# Patient Record
Sex: FEMALE | Race: WHITE | NOT HISPANIC OR LATINO | URBAN - METROPOLITAN AREA
[De-identification: names, ages, dates, MRNs, and addresses within clinical notes are randomized per-mention and may not be internally consistent; named-entity substitution may affect disease eponyms.]

---

## 2024-10-29 ENCOUNTER — OUTPATIENT (OUTPATIENT)
Dept: OUTPATIENT SERVICES | Facility: HOSPITAL | Age: 59
LOS: 1 days | End: 2024-10-29
Payer: COMMERCIAL

## 2024-10-29 ENCOUNTER — APPOINTMENT (OUTPATIENT)
Dept: CT IMAGING | Facility: CLINIC | Age: 59
End: 2024-10-29

## 2024-10-29 DIAGNOSIS — Z87.891 PERSONAL HISTORY OF NICOTINE DEPENDENCE: ICD-10-CM

## 2024-10-29 PROCEDURE — 71271 CT THORAX LUNG CANCER SCR C-: CPT

## 2024-10-29 PROCEDURE — 71271 CT THORAX LUNG CANCER SCR C-: CPT | Mod: 26

## 2024-11-05 ENCOUNTER — APPOINTMENT (OUTPATIENT)
Dept: RHEUMATOLOGY | Facility: CLINIC | Age: 59
End: 2024-11-05
Payer: MEDICARE

## 2024-11-05 ENCOUNTER — NON-APPOINTMENT (OUTPATIENT)
Age: 59
End: 2024-11-05

## 2024-11-05 ENCOUNTER — APPOINTMENT (OUTPATIENT)
Dept: CARDIOLOGY | Facility: CLINIC | Age: 59
End: 2024-11-05
Payer: COMMERCIAL

## 2024-11-05 VITALS
HEART RATE: 72 BPM | DIASTOLIC BLOOD PRESSURE: 76 MMHG | WEIGHT: 136 LBS | OXYGEN SATURATION: 96 % | HEIGHT: 60 IN | BODY MASS INDEX: 26.7 KG/M2 | SYSTOLIC BLOOD PRESSURE: 118 MMHG

## 2024-11-05 VITALS
HEIGHT: 60 IN | OXYGEN SATURATION: 96 % | WEIGHT: 136 LBS | DIASTOLIC BLOOD PRESSURE: 73 MMHG | BODY MASS INDEX: 26.7 KG/M2 | SYSTOLIC BLOOD PRESSURE: 108 MMHG | HEART RATE: 90 BPM

## 2024-11-05 DIAGNOSIS — R31.9 HEMATURIA, UNSPECIFIED: ICD-10-CM

## 2024-11-05 DIAGNOSIS — M19.90 UNSPECIFIED OSTEOARTHRITIS, UNSPECIFIED SITE: ICD-10-CM

## 2024-11-05 DIAGNOSIS — R07.89 OTHER CHEST PAIN: ICD-10-CM

## 2024-11-05 DIAGNOSIS — E78.5 HYPERLIPIDEMIA, UNSPECIFIED: ICD-10-CM

## 2024-11-05 DIAGNOSIS — Z71.85 ENCOUNTER FOR IMMUNIZATION SAFETY COUNSELING: ICD-10-CM

## 2024-11-05 DIAGNOSIS — M06.9 RHEUMATOID ARTHRITIS, UNSPECIFIED: ICD-10-CM

## 2024-11-05 DIAGNOSIS — M81.0 AGE-RELATED OSTEOPOROSIS W/OUT CURRENT PATHOLOGICAL FRACTURE: ICD-10-CM

## 2024-11-05 DIAGNOSIS — R30.0 DYSURIA: ICD-10-CM

## 2024-11-05 DIAGNOSIS — R06.00 DYSPNEA, UNSPECIFIED: ICD-10-CM

## 2024-11-05 DIAGNOSIS — Z79.899 OTHER LONG TERM (CURRENT) DRUG THERAPY: ICD-10-CM

## 2024-11-05 DIAGNOSIS — Z79.60 LONG TERM (CURRENT) USE OF UNSPECIFIED IMMUNOMODULATORS AND IMMUNOSUPPRESSANTS: ICD-10-CM

## 2024-11-05 DIAGNOSIS — Z51.81 ENCOUNTER FOR THERAPEUTIC DRUG LVL MONITORING: ICD-10-CM

## 2024-11-05 DIAGNOSIS — M35.00 SICCA SYNDROME, UNSPECIFIED: ICD-10-CM

## 2024-11-05 PROCEDURE — 93000 ELECTROCARDIOGRAM COMPLETE: CPT

## 2024-11-05 PROCEDURE — G2211 COMPLEX E/M VISIT ADD ON: CPT

## 2024-11-05 PROCEDURE — 99204 OFFICE O/P NEW MOD 45 MIN: CPT

## 2024-11-05 PROCEDURE — 99215 OFFICE O/P EST HI 40 MIN: CPT

## 2024-11-06 LAB
ALBUMIN SERPL ELPH-MCNC: 4.3 G/DL
ALP BLD-CCNC: 71 U/L
ALT SERPL-CCNC: 25 U/L
ANION GAP SERPL CALC-SCNC: 15 MMOL/L
APPEARANCE: CLEAR
AST SERPL-CCNC: 31 U/L
BILIRUB SERPL-MCNC: 0.6 MG/DL
BILIRUBIN URINE: NEGATIVE
BLOOD URINE: NEGATIVE
BUN SERPL-MCNC: 13 MG/DL
CALCIUM SERPL-MCNC: 9.6 MG/DL
CHLORIDE SERPL-SCNC: 106 MMOL/L
CO2 SERPL-SCNC: 21 MMOL/L
COLOR: NORMAL
CREAT SERPL-MCNC: 0.72 MG/DL
CRP SERPL-MCNC: <3 MG/L
EGFR: 96 ML/MIN/1.73M2
ERYTHROCYTE [SEDIMENTATION RATE] IN BLOOD BY WESTERGREN METHOD: 13 MM/HR
GLUCOSE QUALITATIVE U: NEGATIVE MG/DL
HCT VFR BLD CALC: 44.5 %
HGB BLD-MCNC: 14.2 G/DL
KETONES URINE: NEGATIVE MG/DL
LEUKOCYTE ESTERASE URINE: NEGATIVE
MCHC RBC-ENTMCNC: 29.5 PG
MCHC RBC-ENTMCNC: 31.9 G/DL
MCV RBC AUTO: 92.3 FL
NITRITE URINE: NEGATIVE
PH URINE: 5.5
PLATELET # BLD AUTO: 343 K/UL
POTASSIUM SERPL-SCNC: 4.2 MMOL/L
PROT SERPL-MCNC: 6.6 G/DL
PROTEIN URINE: NEGATIVE MG/DL
RBC # BLD: 4.82 M/UL
RBC # FLD: 14.5 %
SODIUM SERPL-SCNC: 142 MMOL/L
SPECIFIC GRAVITY URINE: 1.03
UROBILINOGEN URINE: 0.2 MG/DL
WBC # FLD AUTO: 10.58 K/UL

## 2024-11-08 ENCOUNTER — TRANSCRIPTION ENCOUNTER (OUTPATIENT)
Age: 59
End: 2024-11-08

## 2024-11-08 RX ORDER — UPADACITINIB 15 MG/1
15 TABLET, EXTENDED RELEASE ORAL
Qty: 30 | Refills: 11 | Status: ACTIVE | COMMUNITY
Start: 2024-11-05

## 2024-11-11 ENCOUNTER — NON-APPOINTMENT (OUTPATIENT)
Age: 59
End: 2024-11-11

## 2024-11-13 ENCOUNTER — TRANSCRIPTION ENCOUNTER (OUTPATIENT)
Age: 59
End: 2024-11-13

## 2024-11-13 LAB — BACTERIA UR CULT: ABNORMAL

## 2024-11-26 ENCOUNTER — TRANSCRIPTION ENCOUNTER (OUTPATIENT)
Age: 59
End: 2024-11-26

## 2024-11-26 DIAGNOSIS — R35.0 FREQUENCY OF MICTURITION: ICD-10-CM

## 2024-12-03 ENCOUNTER — APPOINTMENT (OUTPATIENT)
Dept: RADIOLOGY | Facility: CLINIC | Age: 59
End: 2024-12-03
Payer: COMMERCIAL

## 2024-12-03 ENCOUNTER — OUTPATIENT (OUTPATIENT)
Dept: OUTPATIENT SERVICES | Facility: HOSPITAL | Age: 59
LOS: 1 days | End: 2024-12-03
Payer: COMMERCIAL

## 2024-12-03 ENCOUNTER — NON-APPOINTMENT (OUTPATIENT)
Age: 59
End: 2024-12-03

## 2024-12-03 ENCOUNTER — APPOINTMENT (OUTPATIENT)
Dept: INTERNAL MEDICINE | Facility: CLINIC | Age: 59
End: 2024-12-03
Payer: COMMERCIAL

## 2024-12-03 ENCOUNTER — TRANSCRIPTION ENCOUNTER (OUTPATIENT)
Age: 59
End: 2024-12-03

## 2024-12-03 VITALS
DIASTOLIC BLOOD PRESSURE: 76 MMHG | WEIGHT: 135 LBS | BODY MASS INDEX: 26.5 KG/M2 | HEIGHT: 60 IN | RESPIRATION RATE: 16 BRPM | SYSTOLIC BLOOD PRESSURE: 126 MMHG | TEMPERATURE: 99.5 F | OXYGEN SATURATION: 96 % | HEART RATE: 85 BPM

## 2024-12-03 DIAGNOSIS — R10.9 UNSPECIFIED ABDOMINAL PAIN: ICD-10-CM

## 2024-12-03 DIAGNOSIS — R19.7 DIARRHEA, UNSPECIFIED: ICD-10-CM

## 2024-12-03 DIAGNOSIS — R11.0 NAUSEA: ICD-10-CM

## 2024-12-03 PROCEDURE — 99214 OFFICE O/P EST MOD 30 MIN: CPT

## 2024-12-03 PROCEDURE — 74019 RADEX ABDOMEN 2 VIEWS: CPT

## 2024-12-03 PROCEDURE — 74019 RADEX ABDOMEN 2 VIEWS: CPT | Mod: 26

## 2024-12-04 ENCOUNTER — EMERGENCY (EMERGENCY)
Facility: HOSPITAL | Age: 59
LOS: 1 days | Discharge: HOME | End: 2024-12-04
Attending: STUDENT IN AN ORGANIZED HEALTH CARE EDUCATION/TRAINING PROGRAM
Payer: COMMERCIAL

## 2024-12-04 ENCOUNTER — APPOINTMENT (OUTPATIENT)
Dept: CT IMAGING | Facility: CLINIC | Age: 59
End: 2024-12-04

## 2024-12-04 VITALS
TEMPERATURE: 98 F | HEART RATE: 75 BPM | RESPIRATION RATE: 16 BRPM | OXYGEN SATURATION: 98 % | DIASTOLIC BLOOD PRESSURE: 71 MMHG | SYSTOLIC BLOOD PRESSURE: 113 MMHG

## 2024-12-04 VITALS
TEMPERATURE: 98 F | HEIGHT: 60 IN | DIASTOLIC BLOOD PRESSURE: 64 MMHG | RESPIRATION RATE: 16 BRPM | HEART RATE: 76 BPM | SYSTOLIC BLOOD PRESSURE: 125 MMHG | OXYGEN SATURATION: 100 % | WEIGHT: 128.97 LBS

## 2024-12-04 LAB
ALBUMIN SERPL ELPH-MCNC: 4.2 G/DL
ALBUMIN SERPL ELPH-MCNC: 4.2 G/DL — SIGNIFICANT CHANGE UP (ref 3.3–5)
ALP BLD-CCNC: 177 U/L
ALP SERPL-CCNC: 207 U/L — HIGH (ref 40–120)
ALT FLD-CCNC: 77 U/L — HIGH (ref 10–45)
ALT SERPL-CCNC: 63 U/L
ANION GAP SERPL CALC-SCNC: 15 MMOL/L — SIGNIFICANT CHANGE UP (ref 5–17)
ANION GAP SERPL CALC-SCNC: 21 MMOL/L
APPEARANCE UR: CLEAR — SIGNIFICANT CHANGE UP
APTT BLD: 28.4 SEC — SIGNIFICANT CHANGE UP (ref 24.5–35.6)
AST SERPL-CCNC: 68 U/L
AST SERPL-CCNC: 78 U/L — HIGH (ref 10–40)
BACTERIA # UR AUTO: NEGATIVE /HPF — SIGNIFICANT CHANGE UP
BACTERIA UR CULT: NORMAL
BASOPHILS # BLD AUTO: 0.03 K/UL — SIGNIFICANT CHANGE UP (ref 0–0.2)
BASOPHILS # BLD AUTO: 0.04 K/UL
BASOPHILS NFR BLD AUTO: 0.3 % — SIGNIFICANT CHANGE UP (ref 0–2)
BASOPHILS NFR BLD AUTO: 0.4 %
BILIRUB SERPL-MCNC: 1 MG/DL — SIGNIFICANT CHANGE UP (ref 0.2–1.2)
BILIRUB SERPL-MCNC: 1.2 MG/DL
BILIRUB UR-MCNC: NEGATIVE — SIGNIFICANT CHANGE UP
BLD GP AB SCN SERPL QL: NEGATIVE — SIGNIFICANT CHANGE UP
BUN SERPL-MCNC: 11 MG/DL
BUN SERPL-MCNC: 8 MG/DL — SIGNIFICANT CHANGE UP (ref 7–23)
CALCIUM SERPL-MCNC: 9.8 MG/DL
CALCIUM SERPL-MCNC: 9.9 MG/DL — SIGNIFICANT CHANGE UP (ref 8.4–10.5)
CAST: 0 /LPF — SIGNIFICANT CHANGE UP (ref 0–4)
CHLORIDE SERPL-SCNC: 100 MMOL/L — SIGNIFICANT CHANGE UP (ref 96–108)
CHLORIDE SERPL-SCNC: 99 MMOL/L
CO2 SERPL-SCNC: 20 MMOL/L
CO2 SERPL-SCNC: 23 MMOL/L — SIGNIFICANT CHANGE UP (ref 22–31)
COLOR SPEC: YELLOW — SIGNIFICANT CHANGE UP
COVID-19 NUCLEOCAPSID  GAM ANTIBODY INTERPRETATION: POSITIVE
COVID-19 SPIKE DOMAIN ANTIBODY INTERPRETATION: POSITIVE
CREAT SERPL-MCNC: 0.69 MG/DL — SIGNIFICANT CHANGE UP (ref 0.5–1.3)
CREAT SERPL-MCNC: 0.74 MG/DL
CRP SERPL-MCNC: 135 MG/L
DIFF PNL FLD: ABNORMAL
EGFR: 93 ML/MIN/1.73M2
EGFR: 99 ML/MIN/1.73M2 — SIGNIFICANT CHANGE UP
EOSINOPHIL # BLD AUTO: 0.01 K/UL — SIGNIFICANT CHANGE UP (ref 0–0.5)
EOSINOPHIL # BLD AUTO: 0.09 K/UL
EOSINOPHIL NFR BLD AUTO: 0.1 % — SIGNIFICANT CHANGE UP (ref 0–6)
EOSINOPHIL NFR BLD AUTO: 0.8 %
ERYTHROCYTE [SEDIMENTATION RATE] IN BLOOD BY WESTERGREN METHOD: 63 MM/HR
GAS PNL BLDV: SIGNIFICANT CHANGE UP
GLUCOSE SERPL-MCNC: 104 MG/DL — HIGH (ref 70–99)
GLUCOSE UR QL: NEGATIVE MG/DL — SIGNIFICANT CHANGE UP
HCT VFR BLD CALC: 44.4 % — SIGNIFICANT CHANGE UP (ref 34.5–45)
HCT VFR BLD CALC: 46.8 %
HGB BLD-MCNC: 15 G/DL — SIGNIFICANT CHANGE UP (ref 11.5–15.5)
HGB BLD-MCNC: 15.6 G/DL
IMM GRANULOCYTES NFR BLD AUTO: 0.4 %
IMM GRANULOCYTES NFR BLD AUTO: 0.4 % — SIGNIFICANT CHANGE UP (ref 0–0.9)
INR BLD: 1.07 RATIO — SIGNIFICANT CHANGE UP (ref 0.85–1.16)
KETONES UR-MCNC: 15 MG/DL
LEUKOCYTE ESTERASE UR-ACNC: NEGATIVE — SIGNIFICANT CHANGE UP
LIDOCAIN IGE QN: 27 U/L — SIGNIFICANT CHANGE UP (ref 7–60)
LYMPHOCYTES # BLD AUTO: 1.59 K/UL — SIGNIFICANT CHANGE UP (ref 1–3.3)
LYMPHOCYTES # BLD AUTO: 1.81 K/UL
LYMPHOCYTES # BLD AUTO: 15.3 % — SIGNIFICANT CHANGE UP (ref 13–44)
LYMPHOCYTES NFR BLD AUTO: 16 %
MAGNESIUM SERPL-MCNC: 2.1 MG/DL — SIGNIFICANT CHANGE UP (ref 1.6–2.6)
MAN DIFF?: NORMAL
MCHC RBC-ENTMCNC: 29.4 PG
MCHC RBC-ENTMCNC: 29.4 PG — SIGNIFICANT CHANGE UP (ref 27–34)
MCHC RBC-ENTMCNC: 33.3 G/DL
MCHC RBC-ENTMCNC: 33.8 G/DL — SIGNIFICANT CHANGE UP (ref 32–36)
MCV RBC AUTO: 87.1 FL — SIGNIFICANT CHANGE UP (ref 80–100)
MCV RBC AUTO: 88.3 FL
MONOCYTES # BLD AUTO: 0.83 K/UL
MONOCYTES # BLD AUTO: 0.96 K/UL — HIGH (ref 0–0.9)
MONOCYTES NFR BLD AUTO: 7.4 %
MONOCYTES NFR BLD AUTO: 9.3 % — SIGNIFICANT CHANGE UP (ref 2–14)
NEUTROPHILS # BLD AUTO: 7.73 K/UL — HIGH (ref 1.8–7.4)
NEUTROPHILS # BLD AUTO: 8.46 K/UL
NEUTROPHILS NFR BLD AUTO: 74.6 % — SIGNIFICANT CHANGE UP (ref 43–77)
NEUTROPHILS NFR BLD AUTO: 75 %
NITRITE UR-MCNC: NEGATIVE — SIGNIFICANT CHANGE UP
NRBC # BLD: 0 /100 WBCS — SIGNIFICANT CHANGE UP (ref 0–0)
PH UR: 6.5 — SIGNIFICANT CHANGE UP (ref 5–8)
PLATELET # BLD AUTO: 335 K/UL — SIGNIFICANT CHANGE UP (ref 150–400)
PLATELET # BLD AUTO: 374 K/UL
POTASSIUM SERPL-MCNC: 3.6 MMOL/L — SIGNIFICANT CHANGE UP (ref 3.5–5.3)
POTASSIUM SERPL-SCNC: 3.6 MMOL/L — SIGNIFICANT CHANGE UP (ref 3.5–5.3)
POTASSIUM SERPL-SCNC: 4.1 MMOL/L
PROT SERPL-MCNC: 6.8 G/DL
PROT SERPL-MCNC: 7.9 G/DL — SIGNIFICANT CHANGE UP (ref 6–8.3)
PROT UR-MCNC: NEGATIVE MG/DL — SIGNIFICANT CHANGE UP
PROTHROM AB SERPL-ACNC: 12.2 SEC — SIGNIFICANT CHANGE UP (ref 9.9–13.4)
RBC # BLD: 5.1 M/UL — SIGNIFICANT CHANGE UP (ref 3.8–5.2)
RBC # BLD: 5.3 M/UL
RBC # FLD: 13.7 % — SIGNIFICANT CHANGE UP (ref 10.3–14.5)
RBC # FLD: 14.3 %
RBC CASTS # UR COMP ASSIST: 2 /HPF — SIGNIFICANT CHANGE UP (ref 0–4)
RH IG SCN BLD-IMP: POSITIVE — SIGNIFICANT CHANGE UP
SARS-COV-2 AB SERPL IA-ACNC: >250 U/ML
SARS-COV-2 AB SERPL QL IA: 1.04 INDEX
SARS-COV-2 N GENE NPH QL NAA+PROBE: NOT DETECTED
SODIUM SERPL-SCNC: 138 MMOL/L — SIGNIFICANT CHANGE UP (ref 135–145)
SODIUM SERPL-SCNC: 140 MMOL/L
SP GR SPEC: 1.01 — SIGNIFICANT CHANGE UP (ref 1–1.03)
SQUAMOUS # UR AUTO: 0 /HPF — SIGNIFICANT CHANGE UP (ref 0–5)
UROBILINOGEN FLD QL: 0.2 MG/DL — SIGNIFICANT CHANGE UP (ref 0.2–1)
WBC # BLD: 10.36 K/UL — SIGNIFICANT CHANGE UP (ref 3.8–10.5)
WBC # FLD AUTO: 10.36 K/UL — SIGNIFICANT CHANGE UP (ref 3.8–10.5)
WBC # FLD AUTO: 11.28 K/UL
WBC UR QL: 0 /HPF — SIGNIFICANT CHANGE UP (ref 0–5)

## 2024-12-04 PROCEDURE — 74177 CT ABD & PELVIS W/CONTRAST: CPT | Mod: 26,MC

## 2024-12-04 PROCEDURE — 82435 ASSAY OF BLOOD CHLORIDE: CPT

## 2024-12-04 PROCEDURE — 87086 URINE CULTURE/COLONY COUNT: CPT

## 2024-12-04 PROCEDURE — 85018 HEMOGLOBIN: CPT

## 2024-12-04 PROCEDURE — 83605 ASSAY OF LACTIC ACID: CPT

## 2024-12-04 PROCEDURE — 74177 CT ABD & PELVIS W/CONTRAST: CPT | Mod: MC

## 2024-12-04 PROCEDURE — 86850 RBC ANTIBODY SCREEN: CPT

## 2024-12-04 PROCEDURE — 81001 URINALYSIS AUTO W/SCOPE: CPT

## 2024-12-04 PROCEDURE — 82947 ASSAY GLUCOSE BLOOD QUANT: CPT

## 2024-12-04 PROCEDURE — 85025 COMPLETE CBC W/AUTO DIFF WBC: CPT

## 2024-12-04 PROCEDURE — 82330 ASSAY OF CALCIUM: CPT

## 2024-12-04 PROCEDURE — 86901 BLOOD TYPING SEROLOGIC RH(D): CPT

## 2024-12-04 PROCEDURE — 83735 ASSAY OF MAGNESIUM: CPT

## 2024-12-04 PROCEDURE — 96361 HYDRATE IV INFUSION ADD-ON: CPT

## 2024-12-04 PROCEDURE — 96374 THER/PROPH/DIAG INJ IV PUSH: CPT | Mod: XU

## 2024-12-04 PROCEDURE — 96375 TX/PRO/DX INJ NEW DRUG ADDON: CPT

## 2024-12-04 PROCEDURE — 86900 BLOOD TYPING SEROLOGIC ABO: CPT

## 2024-12-04 PROCEDURE — 85730 THROMBOPLASTIN TIME PARTIAL: CPT

## 2024-12-04 PROCEDURE — 84132 ASSAY OF SERUM POTASSIUM: CPT

## 2024-12-04 PROCEDURE — 85014 HEMATOCRIT: CPT

## 2024-12-04 PROCEDURE — 99285 EMERGENCY DEPT VISIT HI MDM: CPT | Mod: 25

## 2024-12-04 PROCEDURE — 93005 ELECTROCARDIOGRAM TRACING: CPT

## 2024-12-04 PROCEDURE — 82803 BLOOD GASES ANY COMBINATION: CPT

## 2024-12-04 PROCEDURE — 85610 PROTHROMBIN TIME: CPT

## 2024-12-04 PROCEDURE — 80053 COMPREHEN METABOLIC PANEL: CPT

## 2024-12-04 PROCEDURE — 99285 EMERGENCY DEPT VISIT HI MDM: CPT

## 2024-12-04 PROCEDURE — 83690 ASSAY OF LIPASE: CPT

## 2024-12-04 PROCEDURE — 84295 ASSAY OF SERUM SODIUM: CPT

## 2024-12-04 RX ORDER — FLUCONAZOLE 200 MG/1
1 TABLET ORAL
Qty: 2 | Refills: 0
Start: 2024-12-04 | End: 2024-12-05

## 2024-12-04 RX ORDER — ACETAMINOPHEN 500MG 500 MG/1
1000 TABLET, COATED ORAL ONCE
Refills: 0 | Status: COMPLETED | OUTPATIENT
Start: 2024-12-04 | End: 2024-12-04

## 2024-12-04 RX ORDER — SODIUM CHLORIDE 9 MG/ML
1000 INJECTION, SOLUTION INTRAMUSCULAR; INTRAVENOUS; SUBCUTANEOUS ONCE
Refills: 0 | Status: COMPLETED | OUTPATIENT
Start: 2024-12-04 | End: 2024-12-04

## 2024-12-04 RX ORDER — AMOXICILLIN/POTASSIUM CLAV 250-125 MG
1 TABLET ORAL ONCE
Refills: 0 | Status: COMPLETED | OUTPATIENT
Start: 2024-12-04 | End: 2024-12-04

## 2024-12-04 RX ORDER — AMOXICILLIN/POTASSIUM CLAV 250-125 MG
875 TABLET ORAL
Qty: 20 | Refills: 0
Start: 2024-12-04 | End: 2024-12-13

## 2024-12-04 RX ORDER — ONDANSETRON HYDROCHLORIDE 4 MG/1
4 TABLET, FILM COATED ORAL ONCE
Refills: 0 | Status: COMPLETED | OUTPATIENT
Start: 2024-12-04 | End: 2024-12-04

## 2024-12-04 RX ADMIN — ACETAMINOPHEN 500MG 400 MILLIGRAM(S): 500 TABLET, COATED ORAL at 09:45

## 2024-12-04 RX ADMIN — ONDANSETRON HYDROCHLORIDE 4 MILLIGRAM(S): 4 TABLET, FILM COATED ORAL at 09:45

## 2024-12-04 RX ADMIN — ACETAMINOPHEN 500MG 1000 MILLIGRAM(S): 500 TABLET, COATED ORAL at 10:30

## 2024-12-04 RX ADMIN — SODIUM CHLORIDE 1000 MILLILITER(S): 9 INJECTION, SOLUTION INTRAMUSCULAR; INTRAVENOUS; SUBCUTANEOUS at 09:45

## 2024-12-04 RX ADMIN — Medication 1 TABLET(S): at 13:32

## 2024-12-04 RX ADMIN — SODIUM CHLORIDE 1000 MILLILITER(S): 9 INJECTION, SOLUTION INTRAMUSCULAR; INTRAVENOUS; SUBCUTANEOUS at 11:30

## 2024-12-04 RX ADMIN — ACETAMINOPHEN 500MG 1000 MILLIGRAM(S): 500 TABLET, COATED ORAL at 10:00

## 2024-12-04 NOTE — ED ADULT NURSE NOTE - OBJECTIVE STATEMENT
0910 59 yr old WF c/o N/V/D abd pain x 3 days associated with decreased urine output yesterday. A&Ox4. Ambulatory. Denies fever or chills. Abd sore to palp diffusely, Distended. PMH rheumatoid arthritis. Color pink. skin W&D

## 2024-12-04 NOTE — ED PROVIDER NOTE - OBJECTIVE STATEMENT
Attending Cornelius: 58 y/o F w/ PMH of RA, HLD, GERD, OA, hypothyroid, vertigo presenting w/ abdominal pain. Reports had been dealing w/ chronic diarrhea but on Sunday noticed that seemed to improve. Shortly after that developed generalized abdominal pain, which has been worsening since. Feels like abdomen has become more bloated. Associated w/ nausea and NBNB vomiting. Has not been able to keep much food/liquids down. Passed small amount of stool last night. Spoke w/ her rheumatologist yesterday who recommended she go to the ED for evaluation. This morning had labs drawn outpatient as well as an abdominal xray. No meds taken for symptoms. Had prior surgeries for gallbladder removal, c-sections, endometriosis. Denies fevers, chills, headache, dizziness, blurred vision, chest pain, cough, shortness of breath, urinary symptoms, MSK pain, rash.

## 2024-12-04 NOTE — ED PROVIDER NOTE - ATTENDING APP SHARED VISIT CONTRIBUTION OF CARE
Attending Cornelius: I performed a face to face evaluation of the patient and obtained a history as well as performed a physical exam. I have discussed their management with the MORAIMA. I have reviewed the MORAIMA note and agree with the documented findings and plan of care, except as noted. This was a shared visit with an MORAIMA. I reviewed and verified the documentation and independently performed my own history/exam/medical decision making. My medical decision making and observations are found above. Please refer to any progress notes for updates on clinical course. My notes supersedes the above MORAIMA note in case of discrepancy

## 2024-12-04 NOTE — ED PROVIDER NOTE - CLINICAL SUMMARY MEDICAL DECISION MAKING FREE TEXT BOX
Attending Cornelius: 60 y/o F w/ PMH of RA, HLD, GERD, OA, hypothyroid, vertigo presenting w/ abdominal pain. Reports had been dealing w/ chronic diarrhea but on Sunday noticed that seemed to improve. Shortly after that developed generalized abdominal pain, which has been worsening since. Feels like abdomen has become more bloated. Associated w/ nausea and NBNB vomiting. Has not been able to keep much food/liquids down. Passed small amount of stool last night. Spoke w/ her rheumatologist yesterday who recommended she go to the ED for evaluation. This morning had labs drawn outpatient as well as an abdominal xray. No meds taken for symptoms. Had prior surgeries for gallbladder removal, c-sections, endometriosis. Denies fevers, chills, headache, dizziness, blurred vision, chest pain, cough, shortness of breath, urinary symptoms, MSK pain, rash. Pt overall no acute distress. Concern for acute abdominal pathology such as obstruction given symptoms. Eval for SBO vs. other acute intra-abdominal pathology. Eval for hepato/billiary/pancreatic abnormalities. Plan for labs, imaging, meds, EKG, IVF. Will reassess the need for additional interventions as clinically warranted. Refer to any progress notes for updates on clinical course and as a continuation of this MDM. Attending Cornelius: 58 y/o F w/ PMH of RA, HLD, GERD, OA, hypothyroid, vertigo presenting w/ abdominal pain. Reports had been dealing w/ chronic diarrhea but on Sunday noticed that seemed to improve. Shortly after that developed generalized abdominal pain, which has been worsening since. Feels like abdomen has become more bloated. Associated w/ nausea and NBNB vomiting. Has not been able to keep much food/liquids down. Passed small amount of stool last night. Spoke w/ her rheumatologist yesterday who recommended she go to the ED for evaluation. This morning had labs drawn outpatient as well as an abdominal xray. No meds taken for symptoms. Had prior surgeries for gallbladder removal, c-sections, endometriosis. Denies fevers, chills, headache, dizziness, blurred vision, chest pain, cough, shortness of breath, urinary symptoms, MSK pain, rash. Pt overall no acute distress. Concern for acute abdominal pathology such as obstruction given symptoms. Eval for SBO vs. other acute intra-abdominal pathology. Eval for hepato/billiary/pancreatic abnormalities. Plan for labs, imaging, meds, EKG, IVF. Will reassess the need for additional interventions as clinically warranted. Refer to any progress notes for updates on clinical course and as a continuation of this MDM.    I, Dr. Steve Shields, independently interpreted the EKG which showed NSR at a rate of 67.

## 2024-12-04 NOTE — ED PROVIDER NOTE - PROGRESS NOTE DETAILS
Colletta NP Labs nonactionable, CTAP with acute uncomplicated diverticulitis will give dose of antibiotics here p.o. challenge GI follow-up Colletta NP- pt tolerated PO intake, Stable for outpatient follow up. Strict return precautions given, verbalized understanding.

## 2024-12-04 NOTE — ED ADULT TRIAGE NOTE - CHIEF COMPLAINT QUOTE
abd pain x 3 days. reports little diarrhea, nausea, vomiting. denies fever. reports history of abd surgeries.

## 2024-12-04 NOTE — ED PROVIDER NOTE - NSFOLLOWUPINSTRUCTIONS_ED_ALL_ED_FT
1) Follow up with your doctor as discussed  2) Return to the ED immediately for new or worsening symptoms inability to eat or drink, fever, worsening pain  3) Please continue to take any home medications as prescribed  4) Your test results from your ED visit were discussed with you prior to discharge  5) You were provided with a copy of your test results  6) We have sent antibiotics to your pharmacy. Please take as directed.    Diverticulitis  Body outline showing the stomach and intestines, with a close-up of diverticula on the large intestine.  Diverticulitis happens when poop (stool) and bacteria get trapped in small pouches in the colon called diverticula. These pouches may form if you have a condition called diverticulosis. When the poop and bacteria get trapped, it can cause an infection and inflammation.    Diverticulitis may cause severe stomach pain and diarrhea. It can also lead to tissue damage in your colon. This can cause bleeding or blockage. In some cases, the diverticula may burst (rupture). This can cause infected poop to go into other parts of your abdomen.    What are the causes?  This condition is caused by poop getting trapped in the diverticula. This allows bacteria to grow. It can lead to inflammation and infection.    What increases the risk?  You are more likely to get this condition if you have diverticulosis. You are also more at risk if:  You are overweight or obese.  You do not get enough exercise.  You drink alcohol.  You smoke.  You eat a lot of red meat, such as beef, pork, or lamb.  You do not get enough fiber. Foods high in fiber include fruits, vegetables, beans, nuts, and whole grains.  You are over 40 years of age.  What are the signs or symptoms?  Symptoms of this condition may include:  Pain and tenderness in the abdomen. This pain is often felt on the left side but may occur in other spots.  Fever and chills.  Nausea and vomiting.  Cramping.  Bloating.  Changes in how often you poop.  Blood in your poop.  How is this diagnosed?  This condition is diagnosed based on your medical history and a physical exam. You may also have tests done to make sure there is nothing else causing your condition. These tests may include:  Blood tests.  Tests done on your pee (urine).  A CT scan of the abdomen.  You may need to have a colonoscopy. This is an exam to look at your whole large intestine. During the exam, a tube is put into the opening of your butt (anus) and then moved into your rectum, colon, and other parts of the large intestine.    This exam is done to look at the diverticula. It can also see if there is something else that may be causing your symptoms.    How is this treated?  Most cases are mild and can be treated at home. You may be told to:  Take over-the-counter pain medicine.  Only eat and drink clear liquids.  Take antibiotics.  Rest.  More severe cases may need to be treated at a hospital. Treatment may include:  Not eating or drinking.  Taking pain medicines.  Getting antibiotics through an IV.  Getting fluids and nutrition through an IV.  Surgery.  Follow these instructions at home:  Medicines    Take over-the-counter and prescription medicines only as told by your health care provider. These include fiber supplements, probiotics, and medicines to soften your poop (stool softeners).  If you were prescribed antibiotics, take them as told by your provider. Do not stop using the antibiotic even if you start to feel better.  Ask your provider if the medicine prescribed to you requires you to avoid driving or using machinery.  Eating and drinking    Pear, berries, artichoke, and beans.  Follow the diet told by your provider. You may need to only eat and drink liquids.  After your symptoms get better, you may be able to return to a more normal diet. You may be told to eat at least 25 grams (25 g) of fiber each day. Fiber makes it easier to poop. Healthy sources of fiber include:  Berries. One cup has 4–8 g of fiber.  Beans or lentils. One-half cup has 5–8 g of fiber.  Green vegetables. One cup has 4 g of fiber.  Avoid eating red meat.  General instructions    Do not use any products that contain nicotine or tobacco. These products include cigarettes, chewing tobacco, and vaping devices, such as e-cigarettes. If you need help quitting, ask your provider.  Exercise for at least 30 minutes, 3 times a week. Exercise hard enough to raise your heart rate and break a sweat.  Contact a health care provider if:  Your pain gets worse.  Your pooping does not go back to normal.  Your symptoms do not get better with treatment.  Your symptoms get worse all of a sudden.  You have a fever.  You vomit more than one time.  Your poop is bloody, black, or michael.  This information is not intended to replace advice given to you by your health care provider. Make sure you discuss any questions you have with your health care provider.

## 2024-12-04 NOTE — ED PROVIDER NOTE - CARE PROVIDER_API CALL
Ángel Guillory  Gastroenterology  [address] [place] [address] [address]  [place] [address]  Phone: [phone]  Fax: [phone]  Follow Up Time:

## 2024-12-04 NOTE — ED PROVIDER NOTE - NSFOLLOWUPCLINICS_GEN_ALL_ED_FT
Gastroenterology at [facility]  Gastroenterology  [address] [place] [id number]  Phone: [phone]  Fax:

## 2024-12-04 NOTE — ED PROVIDER NOTE - PATIENT PORTAL LINK FT
You can access the FollowMyHealth Patient Portal offered by [facility] by registering at the following website: [email or web address]. By joining [facility]’s FollowMyHealth portal, you will also be able to view your health information using other applications (apps) compatible with our system.

## 2024-12-04 NOTE — ED ADULT NURSE NOTE - NSFALLUNIVINTERV_ED_ALL_ED
Bed/Stretcher in lowest position, wheels locked, appropriate side rails in place/Call bell, personal items and telephone in reach/Instruct patient to call for assistance before getting out of bed/chair/stretcher/Non-slip footwear applied when patient is off stretcher/[place] to call system/Physically safe environment - no spills, clutter or unnecessary equipment/Purposeful proactive rounding/Room/bathroom lighting operational, light cord in reach

## 2024-12-04 NOTE — ED PROVIDER NOTE - PHYSICAL EXAMINATION
Gen: NAD, AOx3, able to make needs known, non-toxic  Head: NCAT  HEENT: EOMI, oral mucosa moist, normal conjunctiva  Lung: no respiratory distress, CTAB, no wheezes/rhonchi/rales B/L, speaking in full sentences  CV: RRR, no murmurs  Abd: Mildly distended, soft, tender throughout  MSK: no visible deformities  Neuro: Appears non focal  Skin: Warm, well perfused  Psych: normal affect

## 2024-12-05 ENCOUNTER — NON-APPOINTMENT (OUTPATIENT)
Age: 59
End: 2024-12-05

## 2024-12-05 ENCOUNTER — TRANSCRIPTION ENCOUNTER (OUTPATIENT)
Age: 59
End: 2024-12-05

## 2024-12-05 LAB
CULTURE RESULTS: SIGNIFICANT CHANGE UP
SPECIMEN SOURCE: SIGNIFICANT CHANGE UP

## 2024-12-06 ENCOUNTER — TRANSCRIPTION ENCOUNTER (OUTPATIENT)
Age: 59
End: 2024-12-06

## 2024-12-09 ENCOUNTER — TRANSCRIPTION ENCOUNTER (OUTPATIENT)
Age: 59
End: 2024-12-09

## 2024-12-09 RX ORDER — ABATACEPT 125 MG/ML
125 INJECTION, SOLUTION SUBCUTANEOUS
Qty: 1 | Refills: 9 | Status: ACTIVE | COMMUNITY
Start: 2024-12-06

## 2024-12-10 PROBLEM — Z87.39 PERSONAL HISTORY OF OTHER DISEASES OF THE MUSCULOSKELETAL SYSTEM AND CONNECTIVE TISSUE: Chronic | Status: ACTIVE | Noted: 2024-12-04

## 2024-12-10 LAB — BACTERIA BLD CULT: NORMAL

## 2024-12-11 ENCOUNTER — TRANSCRIPTION ENCOUNTER (OUTPATIENT)
Age: 59
End: 2024-12-11

## 2024-12-11 DIAGNOSIS — K57.32 DIVERTICULITIS OF LARGE INTESTINE W/OUT PERFORATION OR ABSCESS W/OUT BLEEDING: ICD-10-CM

## 2024-12-11 DIAGNOSIS — M06.9 RHEUMATOID ARTHRITIS, UNSPECIFIED: ICD-10-CM

## 2024-12-11 DIAGNOSIS — Z79.60 LONG TERM (CURRENT) USE OF UNSPECIFIED IMMUNOMODULATORS AND IMMUNOSUPPRESSANTS: ICD-10-CM

## 2024-12-13 ENCOUNTER — APPOINTMENT (OUTPATIENT)
Dept: CARDIOLOGY | Facility: CLINIC | Age: 59
End: 2024-12-13
Payer: COMMERCIAL

## 2024-12-13 PROCEDURE — 93306 TTE W/DOPPLER COMPLETE: CPT

## 2024-12-19 LAB
ALBUMIN SERPL ELPH-MCNC: 4.1 G/DL
ALP BLD-CCNC: 91 U/L
ALT SERPL-CCNC: 22 U/L
ANION GAP SERPL CALC-SCNC: 13 MMOL/L
AST SERPL-CCNC: 26 U/L
BILIRUB SERPL-MCNC: 0.4 MG/DL
BUN SERPL-MCNC: 9 MG/DL
CALCIUM SERPL-MCNC: 9.4 MG/DL
CHLORIDE SERPL-SCNC: 105 MMOL/L
CO2 SERPL-SCNC: 24 MMOL/L
CREAT SERPL-MCNC: 0.77 MG/DL
CRP SERPL-MCNC: <3 MG/L
EGFR: 88 ML/MIN/1.73M2
ERYTHROCYTE [SEDIMENTATION RATE] IN BLOOD BY WESTERGREN METHOD: 23 MM/HR
HCT VFR BLD CALC: 44 %
HGB BLD-MCNC: 14.2 G/DL
MCHC RBC-ENTMCNC: 29.2 PG
MCHC RBC-ENTMCNC: 32.3 G/DL
MCV RBC AUTO: 90.3 FL
PLATELET # BLD AUTO: 439 K/UL
POTASSIUM SERPL-SCNC: 4.3 MMOL/L
PROT SERPL-MCNC: 6.5 G/DL
RBC # BLD: 4.87 M/UL
RBC # FLD: 14.3 %
SODIUM SERPL-SCNC: 141 MMOL/L
WBC # FLD AUTO: 7.75 K/UL

## 2025-01-03 ENCOUNTER — APPOINTMENT (OUTPATIENT)
Dept: INTERNAL MEDICINE | Facility: CLINIC | Age: 60
End: 2025-01-03

## 2025-01-07 ENCOUNTER — TRANSCRIPTION ENCOUNTER (OUTPATIENT)
Age: 60
End: 2025-01-07

## 2025-01-08 ENCOUNTER — APPOINTMENT (OUTPATIENT)
Dept: RHEUMATOLOGY | Facility: CLINIC | Age: 60
End: 2025-01-08
Payer: COMMERCIAL

## 2025-01-08 ENCOUNTER — APPOINTMENT (OUTPATIENT)
Dept: COLORECTAL SURGERY | Facility: CLINIC | Age: 60
End: 2025-01-08
Payer: COMMERCIAL

## 2025-01-08 VITALS
HEIGHT: 60 IN | HEART RATE: 87 BPM | RESPIRATION RATE: 14 BRPM | SYSTOLIC BLOOD PRESSURE: 132 MMHG | DIASTOLIC BLOOD PRESSURE: 88 MMHG | BODY MASS INDEX: 25.52 KG/M2 | WEIGHT: 130 LBS

## 2025-01-08 DIAGNOSIS — M35.00 SICCA SYNDROME, UNSPECIFIED: ICD-10-CM

## 2025-01-08 DIAGNOSIS — Z79.899 OTHER LONG TERM (CURRENT) DRUG THERAPY: ICD-10-CM

## 2025-01-08 DIAGNOSIS — M06.9 RHEUMATOID ARTHRITIS, UNSPECIFIED: ICD-10-CM

## 2025-01-08 DIAGNOSIS — Z79.60 LONG TERM (CURRENT) USE OF UNSPECIFIED IMMUNOMODULATORS AND IMMUNOSUPPRESSANTS: ICD-10-CM

## 2025-01-08 DIAGNOSIS — Z02.82 ENCOUNTER FOR ADOPTION SERVICES: ICD-10-CM

## 2025-01-08 DIAGNOSIS — M19.90 UNSPECIFIED OSTEOARTHRITIS, UNSPECIFIED SITE: ICD-10-CM

## 2025-01-08 DIAGNOSIS — Z51.81 ENCOUNTER FOR THERAPEUTIC DRUG LVL MONITORING: ICD-10-CM

## 2025-01-08 DIAGNOSIS — R19.7 DIARRHEA, UNSPECIFIED: ICD-10-CM

## 2025-01-08 DIAGNOSIS — I73.00 RAYNAUD'S SYNDROME W/OUT GANGRENE: ICD-10-CM

## 2025-01-08 DIAGNOSIS — L65.9 NONSCARRING HAIR LOSS, UNSPECIFIED: ICD-10-CM

## 2025-01-08 DIAGNOSIS — K57.32 DIVERTICULITIS OF LARGE INTESTINE W/OUT PERFORATION OR ABSCESS W/OUT BLEEDING: ICD-10-CM

## 2025-01-08 PROCEDURE — G2211 COMPLEX E/M VISIT ADD ON: CPT

## 2025-01-08 PROCEDURE — 99215 OFFICE O/P EST HI 40 MIN: CPT

## 2025-01-08 PROCEDURE — 99205 OFFICE O/P NEW HI 60 MIN: CPT

## 2025-01-09 ENCOUNTER — APPOINTMENT (OUTPATIENT)
Dept: CT IMAGING | Facility: CLINIC | Age: 60
End: 2025-01-09
Payer: COMMERCIAL

## 2025-01-09 ENCOUNTER — NON-APPOINTMENT (OUTPATIENT)
Age: 60
End: 2025-01-09

## 2025-01-09 ENCOUNTER — RESULT REVIEW (OUTPATIENT)
Age: 60
End: 2025-01-09

## 2025-01-09 ENCOUNTER — OUTPATIENT (OUTPATIENT)
Dept: OUTPATIENT SERVICES | Facility: HOSPITAL | Age: 60
LOS: 1 days | End: 2025-01-09
Payer: COMMERCIAL

## 2025-01-09 ENCOUNTER — APPOINTMENT (OUTPATIENT)
Dept: MAMMOGRAPHY | Facility: CLINIC | Age: 60
End: 2025-01-09
Payer: COMMERCIAL

## 2025-01-09 DIAGNOSIS — E78.5 HYPERLIPIDEMIA, UNSPECIFIED: ICD-10-CM

## 2025-01-09 DIAGNOSIS — R07.89 OTHER CHEST PAIN: ICD-10-CM

## 2025-01-09 LAB
HCT VFR BLD CALC: 42.3 %
HGB BLD-MCNC: 14.4 G/DL
MCHC RBC-ENTMCNC: 29.8 PG
MCHC RBC-ENTMCNC: 34 G/DL
MCV RBC AUTO: 87.4 FL
PLATELET # BLD AUTO: 256 K/UL
RBC # BLD: 4.84 M/UL
RBC # FLD: 13.9 %
WBC # FLD AUTO: 6.7 K/UL

## 2025-01-09 PROCEDURE — 77067 SCR MAMMO BI INCL CAD: CPT | Mod: 26

## 2025-01-09 PROCEDURE — 77067 SCR MAMMO BI INCL CAD: CPT

## 2025-01-09 PROCEDURE — 75574 CT ANGIO HRT W/3D IMAGE: CPT | Mod: 26

## 2025-01-09 PROCEDURE — 77063 BREAST TOMOSYNTHESIS BI: CPT

## 2025-01-09 PROCEDURE — 77063 BREAST TOMOSYNTHESIS BI: CPT | Mod: 26

## 2025-01-09 PROCEDURE — 75574 CT ANGIO HRT W/3D IMAGE: CPT

## 2025-01-10 ENCOUNTER — NON-APPOINTMENT (OUTPATIENT)
Age: 60
End: 2025-01-10

## 2025-01-10 LAB
DEPRECATED O AND P PREP STL: NORMAL
GI PCR PANEL: DETECTED
ROTAVIRUS A: DETECTED

## 2025-01-14 ENCOUNTER — TRANSCRIPTION ENCOUNTER (OUTPATIENT)
Age: 60
End: 2025-01-14

## 2025-01-14 ENCOUNTER — APPOINTMENT (OUTPATIENT)
Dept: CT IMAGING | Facility: CLINIC | Age: 60
End: 2025-01-14

## 2025-01-16 ENCOUNTER — TRANSCRIPTION ENCOUNTER (OUTPATIENT)
Age: 60
End: 2025-01-16

## 2025-01-27 ENCOUNTER — APPOINTMENT (OUTPATIENT)
Dept: UROGYNECOLOGY | Facility: CLINIC | Age: 60
End: 2025-01-27
Payer: MEDICARE

## 2025-01-27 VITALS
HEIGHT: 60 IN | OXYGEN SATURATION: 99 % | TEMPERATURE: 97.4 F | BODY MASS INDEX: 25.52 KG/M2 | DIASTOLIC BLOOD PRESSURE: 78 MMHG | HEART RATE: 92 BPM | WEIGHT: 130 LBS | SYSTOLIC BLOOD PRESSURE: 110 MMHG

## 2025-01-27 LAB
BILIRUB UR QL STRIP: NEGATIVE
CLARITY UR: CLEAR
COLLECTION METHOD: NORMAL
GLUCOSE UR-MCNC: NEGATIVE
HCG UR QL: 0.2 EU/DL
HGB UR QL STRIP.AUTO: NORMAL
KETONES UR-MCNC: NEGATIVE
LEUKOCYTE ESTERASE UR QL STRIP: NEGATIVE
NITRITE UR QL STRIP: NEGATIVE
PH UR STRIP: 5.5
PROT UR STRIP-MCNC: NEGATIVE
SP GR UR STRIP: 1.02

## 2025-01-27 PROCEDURE — 51798 US URINE CAPACITY MEASURE: CPT

## 2025-01-27 PROCEDURE — 99204 OFFICE O/P NEW MOD 45 MIN: CPT

## 2025-01-27 PROCEDURE — 81003 URINALYSIS AUTO W/O SCOPE: CPT | Mod: QW

## 2025-01-27 PROCEDURE — 99459 PELVIC EXAMINATION: CPT

## 2025-01-27 RX ORDER — ESTRADIOL 0.1 MG/G
0.1 CREAM VAGINAL
Qty: 1 | Refills: 0 | Status: ACTIVE | COMMUNITY
Start: 2025-01-27 | End: 1900-01-01

## 2025-01-28 LAB
APPEARANCE: CLEAR
BACTERIA: NEGATIVE /HPF
BILIRUBIN URINE: NEGATIVE
BLOOD URINE: NEGATIVE
CAST: 0 /LPF
COLOR: YELLOW
EPITHELIAL CELLS: 3 /HPF
GLUCOSE QUALITATIVE U: NEGATIVE MG/DL
KETONES URINE: NEGATIVE MG/DL
LEUKOCYTE ESTERASE URINE: NEGATIVE
MICROSCOPIC-UA: NORMAL
NITRITE URINE: NEGATIVE
PH URINE: 5.5
PROTEIN URINE: NEGATIVE MG/DL
RED BLOOD CELLS URINE: 1 /HPF
SPECIFIC GRAVITY URINE: 1.02
UROBILINOGEN URINE: 0.2 MG/DL
WHITE BLOOD CELLS URINE: 0 /HPF

## 2025-01-30 LAB — BACTERIA UR CULT: NORMAL

## 2025-02-10 ENCOUNTER — APPOINTMENT (OUTPATIENT)
Dept: DERMATOLOGY | Facility: CLINIC | Age: 60
End: 2025-02-10
Payer: MEDICARE

## 2025-02-10 ENCOUNTER — NON-APPOINTMENT (OUTPATIENT)
Age: 60
End: 2025-02-10

## 2025-02-10 VITALS — HEIGHT: 60 IN | BODY MASS INDEX: 25.52 KG/M2 | WEIGHT: 130 LBS

## 2025-02-10 DIAGNOSIS — L65.9 NONSCARRING HAIR LOSS, UNSPECIFIED: ICD-10-CM

## 2025-02-10 DIAGNOSIS — Z12.83 ENCOUNTER FOR SCREENING FOR MALIGNANT NEOPLASM OF SKIN: ICD-10-CM

## 2025-02-10 DIAGNOSIS — R22.0 LOCALIZED SWELLING, MASS AND LUMP, HEAD: ICD-10-CM

## 2025-02-10 DIAGNOSIS — D22.9 MELANOCYTIC NEVI, UNSPECIFIED: ICD-10-CM

## 2025-02-10 PROCEDURE — 99214 OFFICE O/P EST MOD 30 MIN: CPT

## 2025-02-25 ENCOUNTER — TRANSCRIPTION ENCOUNTER (OUTPATIENT)
Age: 60
End: 2025-02-25

## 2025-02-27 ENCOUNTER — APPOINTMENT (OUTPATIENT)
Dept: INTERNAL MEDICINE | Facility: CLINIC | Age: 60
End: 2025-02-27
Payer: COMMERCIAL

## 2025-02-27 VITALS
DIASTOLIC BLOOD PRESSURE: 74 MMHG | SYSTOLIC BLOOD PRESSURE: 116 MMHG | HEART RATE: 87 BPM | TEMPERATURE: 98.2 F | RESPIRATION RATE: 14 BRPM | WEIGHT: 130 LBS | BODY MASS INDEX: 25.52 KG/M2 | OXYGEN SATURATION: 97 % | HEIGHT: 60 IN

## 2025-02-27 DIAGNOSIS — E78.5 HYPERLIPIDEMIA, UNSPECIFIED: ICD-10-CM

## 2025-02-27 DIAGNOSIS — E11.9 TYPE 2 DIABETES MELLITUS W/OUT COMPLICATIONS: ICD-10-CM

## 2025-02-27 DIAGNOSIS — Z00.00 ENCOUNTER FOR GENERAL ADULT MEDICAL EXAMINATION W/OUT ABNORMAL FINDINGS: ICD-10-CM

## 2025-02-27 PROCEDURE — 99396 PREV VISIT EST AGE 40-64: CPT

## 2025-02-28 LAB
25(OH)D3 SERPL-MCNC: 38.4 NG/ML
ALBUMIN SERPL ELPH-MCNC: 4.3 G/DL
ALP BLD-CCNC: 95 U/L
ALT SERPL-CCNC: 17 U/L
ANION GAP SERPL CALC-SCNC: 14 MMOL/L
APPEARANCE: CLEAR
AST SERPL-CCNC: 27 U/L
BACTERIA: NEGATIVE /HPF
BASOPHILS # BLD AUTO: 0.06 K/UL
BASOPHILS NFR BLD AUTO: 0.7 %
BILIRUB SERPL-MCNC: 0.7 MG/DL
BILIRUBIN URINE: NEGATIVE
BLOOD URINE: NEGATIVE
BUN SERPL-MCNC: 12 MG/DL
CALCIUM SERPL-MCNC: 9.6 MG/DL
CAST: 0 /LPF
CHLORIDE SERPL-SCNC: 105 MMOL/L
CHOLEST SERPL-MCNC: 190 MG/DL
CO2 SERPL-SCNC: 21 MMOL/L
COLOR: YELLOW
CREAT SERPL-MCNC: 0.72 MG/DL
EGFR: 95 ML/MIN/1.73M2
EOSINOPHIL # BLD AUTO: 0.2 K/UL
EOSINOPHIL NFR BLD AUTO: 2.4 %
EPITHELIAL CELLS: 0 /HPF
ESTIMATED AVERAGE GLUCOSE: 126 MG/DL
FOLATE SERPL-MCNC: >20 NG/ML
GLUCOSE QUALITATIVE U: NEGATIVE MG/DL
GLUCOSE SERPL-MCNC: 105 MG/DL
HBA1C MFR BLD HPLC: 6 %
HCT VFR BLD CALC: 43.4 %
HDLC SERPL-MCNC: 59 MG/DL
HGB BLD-MCNC: 14.3 G/DL
IMM GRANULOCYTES NFR BLD AUTO: 0.4 %
KETONES URINE: NEGATIVE MG/DL
LDLC SERPL CALC-MCNC: 109 MG/DL
LEUKOCYTE ESTERASE URINE: NEGATIVE
LYMPHOCYTES # BLD AUTO: 2.49 K/UL
LYMPHOCYTES NFR BLD AUTO: 30.1 %
MAN DIFF?: NORMAL
MCHC RBC-ENTMCNC: 28.8 PG
MCHC RBC-ENTMCNC: 32.9 G/DL
MCV RBC AUTO: 87.5 FL
MICROSCOPIC-UA: NORMAL
MONOCYTES # BLD AUTO: 0.55 K/UL
MONOCYTES NFR BLD AUTO: 6.7 %
NEUTROPHILS # BLD AUTO: 4.94 K/UL
NEUTROPHILS NFR BLD AUTO: 59.7 %
NITRITE URINE: NEGATIVE
NONHDLC SERPL-MCNC: 132 MG/DL
PH URINE: 5.5
PLATELET # BLD AUTO: 288 K/UL
POTASSIUM SERPL-SCNC: 4.2 MMOL/L
PROT SERPL-MCNC: 7 G/DL
PROTEIN URINE: NEGATIVE MG/DL
RBC # BLD: 4.96 M/UL
RBC # FLD: 13.7 %
RED BLOOD CELLS URINE: 0 /HPF
SODIUM SERPL-SCNC: 140 MMOL/L
SPECIFIC GRAVITY URINE: 1.02
TRIGL SERPL-MCNC: 133 MG/DL
TSH SERPL-ACNC: 3.83 UIU/ML
UROBILINOGEN URINE: 0.2 MG/DL
VIT B12 SERPL-MCNC: 570 PG/ML
WBC # FLD AUTO: 8.27 K/UL
WHITE BLOOD CELLS URINE: 0 /HPF

## 2025-03-06 ENCOUNTER — TRANSCRIPTION ENCOUNTER (OUTPATIENT)
Age: 60
End: 2025-03-06

## 2025-03-06 ENCOUNTER — APPOINTMENT (OUTPATIENT)
Dept: GASTROENTEROLOGY | Facility: CLINIC | Age: 60
End: 2025-03-06
Payer: COMMERCIAL

## 2025-03-06 VITALS
HEART RATE: 87 BPM | DIASTOLIC BLOOD PRESSURE: 88 MMHG | HEIGHT: 60 IN | BODY MASS INDEX: 25.91 KG/M2 | SYSTOLIC BLOOD PRESSURE: 130 MMHG | OXYGEN SATURATION: 98 % | WEIGHT: 132 LBS

## 2025-03-06 DIAGNOSIS — Z87.19 PERSONAL HISTORY OF OTHER DISEASES OF THE DIGESTIVE SYSTEM: ICD-10-CM

## 2025-03-06 PROCEDURE — 99215 OFFICE O/P EST HI 40 MIN: CPT

## 2025-03-11 ENCOUNTER — APPOINTMENT (OUTPATIENT)
Dept: RHEUMATOLOGY | Facility: CLINIC | Age: 60
End: 2025-03-11
Payer: COMMERCIAL

## 2025-03-11 VITALS
DIASTOLIC BLOOD PRESSURE: 74 MMHG | WEIGHT: 130 LBS | BODY MASS INDEX: 25.52 KG/M2 | HEART RATE: 99 BPM | SYSTOLIC BLOOD PRESSURE: 118 MMHG | HEIGHT: 60 IN | OXYGEN SATURATION: 98 %

## 2025-03-11 DIAGNOSIS — M06.9 RHEUMATOID ARTHRITIS, UNSPECIFIED: ICD-10-CM

## 2025-03-11 DIAGNOSIS — Z79.60 LONG TERM (CURRENT) USE OF UNSPECIFIED IMMUNOMODULATORS AND IMMUNOSUPPRESSANTS: ICD-10-CM

## 2025-03-11 DIAGNOSIS — M35.00 SICCA SYNDROME, UNSPECIFIED: ICD-10-CM

## 2025-03-11 DIAGNOSIS — R19.7 DIARRHEA, UNSPECIFIED: ICD-10-CM

## 2025-03-11 DIAGNOSIS — Z51.81 ENCOUNTER FOR THERAPEUTIC DRUG LVL MONITORING: ICD-10-CM

## 2025-03-11 PROCEDURE — 99214 OFFICE O/P EST MOD 30 MIN: CPT

## 2025-03-11 PROCEDURE — G2211 COMPLEX E/M VISIT ADD ON: CPT

## 2025-03-13 ENCOUNTER — TRANSCRIPTION ENCOUNTER (OUTPATIENT)
Age: 60
End: 2025-03-13

## 2025-03-16 LAB
ALBUMIN SERPL ELPH-MCNC: 4.4 G/DL
ALP BLD-CCNC: 97 U/L
ALT SERPL-CCNC: 19 U/L
ANION GAP SERPL CALC-SCNC: 13 MMOL/L
AST SERPL-CCNC: 22 U/L
BILIRUB SERPL-MCNC: 1 MG/DL
BUN SERPL-MCNC: 10 MG/DL
C3 SERPL-MCNC: 177 MG/DL
C4 SERPL-MCNC: 25 MG/DL
CALCIUM SERPL-MCNC: 10.1 MG/DL
CHLORIDE SERPL-SCNC: 103 MMOL/L
CO2 SERPL-SCNC: 22 MMOL/L
CREAT SERPL-MCNC: 0.71 MG/DL
CRP SERPL-MCNC: 5 MG/L
EGFRCR SERPLBLD CKD-EPI 2021: 97 ML/MIN/1.73M2
ERYTHROCYTE [SEDIMENTATION RATE] IN BLOOD BY WESTERGREN METHOD: 41 MM/HR
HCT VFR BLD CALC: 44.9 %
HGB BLD-MCNC: 14.7 G/DL
MCHC RBC-ENTMCNC: 28.9 PG
MCHC RBC-ENTMCNC: 32.7 G/DL
MCV RBC AUTO: 88.2 FL
PLATELET # BLD AUTO: 268 K/UL
POTASSIUM SERPL-SCNC: 4 MMOL/L
PROT SERPL-MCNC: 7.1 G/DL
RBC # BLD: 5.09 M/UL
RBC # FLD: 13.9 %
SODIUM SERPL-SCNC: 138 MMOL/L
WBC # FLD AUTO: 8.14 K/UL

## 2025-03-17 ENCOUNTER — TRANSCRIPTION ENCOUNTER (OUTPATIENT)
Age: 60
End: 2025-03-17

## 2025-03-19 ENCOUNTER — NON-APPOINTMENT (OUTPATIENT)
Age: 60
End: 2025-03-19

## 2025-03-24 ENCOUNTER — APPOINTMENT (OUTPATIENT)
Dept: UROGYNECOLOGY | Facility: CLINIC | Age: 60
End: 2025-03-24
Payer: MEDICARE

## 2025-03-24 ENCOUNTER — RESULT CHARGE (OUTPATIENT)
Age: 60
End: 2025-03-24

## 2025-03-24 VITALS
DIASTOLIC BLOOD PRESSURE: 79 MMHG | BODY MASS INDEX: 25.52 KG/M2 | SYSTOLIC BLOOD PRESSURE: 110 MMHG | OXYGEN SATURATION: 99 % | WEIGHT: 130 LBS | TEMPERATURE: 98.2 F | HEART RATE: 79 BPM | HEIGHT: 60 IN

## 2025-03-24 DIAGNOSIS — N39.0 URINARY TRACT INFECTION, SITE NOT SPECIFIED: ICD-10-CM

## 2025-03-24 DIAGNOSIS — R30.0 DYSURIA: ICD-10-CM

## 2025-03-24 DIAGNOSIS — R35.0 FREQUENCY OF MICTURITION: ICD-10-CM

## 2025-03-24 LAB
BILIRUB UR QL STRIP: NEGATIVE
CLARITY UR: CLEAR
COLLECTION METHOD: NORMAL
GLUCOSE UR-MCNC: NEGATIVE
HCG UR QL: 0.2 EU/DL
HGB UR QL STRIP.AUTO: NORMAL
KETONES UR-MCNC: NEGATIVE
LEUKOCYTE ESTERASE UR QL STRIP: NEGATIVE
NITRITE UR QL STRIP: NEGATIVE
PH UR STRIP: 5.5
PROT UR STRIP-MCNC: NEGATIVE
SP GR UR STRIP: >=1.03

## 2025-03-24 PROCEDURE — 81003 URINALYSIS AUTO W/O SCOPE: CPT | Mod: QW

## 2025-03-24 PROCEDURE — 99214 OFFICE O/P EST MOD 30 MIN: CPT

## 2025-03-25 ENCOUNTER — NON-APPOINTMENT (OUTPATIENT)
Age: 60
End: 2025-03-25

## 2025-03-25 LAB
APPEARANCE: CLEAR
BACTERIA: NEGATIVE /HPF
BILIRUBIN URINE: NEGATIVE
BLOOD URINE: NEGATIVE
CAST: 0 /LPF
COLOR: YELLOW
EPITHELIAL CELLS: 1 /HPF
GLUCOSE QUALITATIVE U: NEGATIVE MG/DL
KETONES URINE: NEGATIVE MG/DL
LEUKOCYTE ESTERASE URINE: NEGATIVE
MICROSCOPIC-UA: NORMAL
NITRITE URINE: NEGATIVE
PH URINE: 5.5
PROTEIN URINE: NEGATIVE MG/DL
RED BLOOD CELLS URINE: 2 /HPF
SPECIFIC GRAVITY URINE: 1.03
UROBILINOGEN URINE: 0.2 MG/DL
WHITE BLOOD CELLS URINE: 0 /HPF

## 2025-03-26 ENCOUNTER — APPOINTMENT (OUTPATIENT)
Dept: RHEUMATOLOGY | Facility: CLINIC | Age: 60
End: 2025-03-26

## 2025-03-31 DIAGNOSIS — M54.50 LOW BACK PAIN, UNSPECIFIED: ICD-10-CM

## 2025-04-02 ENCOUNTER — OUTPATIENT (OUTPATIENT)
Dept: OUTPATIENT SERVICES | Facility: HOSPITAL | Age: 60
LOS: 1 days | End: 2025-04-02
Payer: COMMERCIAL

## 2025-04-02 ENCOUNTER — APPOINTMENT (OUTPATIENT)
Dept: RADIOLOGY | Facility: CLINIC | Age: 60
End: 2025-04-02
Payer: COMMERCIAL

## 2025-04-02 DIAGNOSIS — M06.9 RHEUMATOID ARTHRITIS, UNSPECIFIED: ICD-10-CM

## 2025-04-02 PROCEDURE — 73100 X-RAY EXAM OF WRIST: CPT

## 2025-04-02 PROCEDURE — 73100 X-RAY EXAM OF WRIST: CPT | Mod: 26,50

## 2025-04-02 PROCEDURE — 73660 X-RAY EXAM OF TOE(S): CPT | Mod: 26,50

## 2025-04-02 PROCEDURE — 73120 X-RAY EXAM OF HAND: CPT | Mod: 26,50

## 2025-04-02 PROCEDURE — 73120 X-RAY EXAM OF HAND: CPT

## 2025-04-02 PROCEDURE — 73660 X-RAY EXAM OF TOE(S): CPT

## 2025-04-07 DIAGNOSIS — R39.15 URGENCY OF URINATION: ICD-10-CM

## 2025-04-07 DIAGNOSIS — E78.00 PURE HYPERCHOLESTEROLEMIA, UNSPECIFIED: ICD-10-CM

## 2025-04-07 DIAGNOSIS — M53.9 DORSOPATHY, UNSPECIFIED: ICD-10-CM

## 2025-04-07 DIAGNOSIS — R35.1 NOCTURIA: ICD-10-CM

## 2025-04-07 DIAGNOSIS — K58.9 IRRITABLE BOWEL SYNDROME, UNSPECIFIED: ICD-10-CM

## 2025-04-07 DIAGNOSIS — Z79.52 LONG TERM (CURRENT) USE OF SYSTEMIC STEROIDS: ICD-10-CM

## 2025-04-07 DIAGNOSIS — R32 UNSPECIFIED URINARY INCONTINENCE: ICD-10-CM

## 2025-04-07 DIAGNOSIS — Z80.0 FAMILY HISTORY OF MALIGNANT NEOPLASM OF DIGESTIVE ORGANS: ICD-10-CM

## 2025-04-07 RX ORDER — LEVOTHYROXINE SODIUM 75 UG/1
75 TABLET ORAL
Refills: 0 | Status: ACTIVE | COMMUNITY

## 2025-04-22 ENCOUNTER — APPOINTMENT (OUTPATIENT)
Dept: CT IMAGING | Facility: CLINIC | Age: 60
End: 2025-04-22

## 2025-05-22 ENCOUNTER — APPOINTMENT (OUTPATIENT)
Dept: RHEUMATOLOGY | Facility: CLINIC | Age: 60
End: 2025-05-22
Payer: COMMERCIAL

## 2025-05-22 DIAGNOSIS — Z79.60 LONG TERM (CURRENT) USE OF UNSPECIFIED IMMUNOMODULATORS AND IMMUNOSUPPRESSANTS: ICD-10-CM

## 2025-05-22 DIAGNOSIS — M35.00 SICCA SYNDROME, UNSPECIFIED: ICD-10-CM

## 2025-05-22 DIAGNOSIS — Z51.81 ENCOUNTER FOR THERAPEUTIC DRUG LVL MONITORING: ICD-10-CM

## 2025-05-22 DIAGNOSIS — M06.9 RHEUMATOID ARTHRITIS, UNSPECIFIED: ICD-10-CM

## 2025-05-22 DIAGNOSIS — L65.9 NONSCARRING HAIR LOSS, UNSPECIFIED: ICD-10-CM

## 2025-05-22 PROCEDURE — 99214 OFFICE O/P EST MOD 30 MIN: CPT | Mod: 95

## 2025-05-22 PROCEDURE — G2211 COMPLEX E/M VISIT ADD ON: CPT | Mod: 95

## 2025-05-29 ENCOUNTER — TRANSCRIPTION ENCOUNTER (OUTPATIENT)
Age: 60
End: 2025-05-29

## 2025-06-10 ENCOUNTER — TRANSCRIPTION ENCOUNTER (OUTPATIENT)
Age: 60
End: 2025-06-10

## 2025-06-19 ENCOUNTER — TRANSCRIPTION ENCOUNTER (OUTPATIENT)
Age: 60
End: 2025-06-19

## 2025-06-26 ENCOUNTER — RX RENEWAL (OUTPATIENT)
Age: 60
End: 2025-06-26

## 2025-06-27 ENCOUNTER — RX RENEWAL (OUTPATIENT)
Age: 60
End: 2025-06-27

## 2025-06-27 ENCOUNTER — TRANSCRIPTION ENCOUNTER (OUTPATIENT)
Age: 60
End: 2025-06-27

## 2025-07-10 ENCOUNTER — APPOINTMENT (OUTPATIENT)
Dept: RHEUMATOLOGY | Facility: CLINIC | Age: 60
End: 2025-07-10
Payer: COMMERCIAL

## 2025-07-10 PROBLEM — H92.01 REFERRED OTALGIA OF RIGHT EAR: Status: RESOLVED | Noted: 2019-05-08 | Resolved: 2025-07-10

## 2025-07-10 PROBLEM — K29.80 DUODENITIS: Status: RESOLVED | Noted: 2019-03-11 | Resolved: 2025-07-10

## 2025-07-10 PROBLEM — Z87.898 HISTORY OF DYSPHAGIA: Status: RESOLVED | Noted: 2018-03-15 | Resolved: 2025-07-10

## 2025-07-10 PROBLEM — Z87.898 HISTORY OF ABDOMINAL PAIN: Status: RESOLVED | Noted: 2024-12-03 | Resolved: 2025-07-10

## 2025-07-10 PROBLEM — Z87.440 HISTORY OF RECURRENT URINARY TRACT INFECTION: Status: RESOLVED | Noted: 2025-03-24 | Resolved: 2025-07-10

## 2025-07-10 PROBLEM — R10.2 PAIN PELVIC: Status: RESOLVED | Noted: 2022-08-30 | Resolved: 2025-07-10

## 2025-07-10 PROBLEM — Z79.899 NEW MEDICATION ADDED: Status: ACTIVE | Noted: 2025-07-10

## 2025-07-10 PROBLEM — Z87.898 HISTORY OF DYSPNEA: Status: RESOLVED | Noted: 2024-11-05 | Resolved: 2025-07-10

## 2025-07-10 PROBLEM — Z87.898 HISTORY OF VOMITING: Status: RESOLVED | Noted: 2018-09-04 | Resolved: 2025-07-10

## 2025-07-10 PROBLEM — Z79.899 NEW MEDICATION ADDED: Status: RESOLVED | Noted: 2024-07-26 | Resolved: 2025-07-10

## 2025-07-10 PROBLEM — Z87.898 HISTORY OF VERTIGO: Status: RESOLVED | Noted: 2018-09-27 | Resolved: 2025-07-10

## 2025-07-10 PROBLEM — M25.551 RIGHT HIP PAIN: Status: RESOLVED | Noted: 2018-01-26 | Resolved: 2025-07-10

## 2025-07-10 PROBLEM — M79.671 PAIN OF BOTH HEELS: Status: RESOLVED | Noted: 2022-09-21 | Resolved: 2025-07-10

## 2025-07-10 PROBLEM — Z87.09 HISTORY OF LARYNGEAL SPASM: Status: RESOLVED | Noted: 2022-08-30 | Resolved: 2025-07-10

## 2025-07-10 PROBLEM — R13.12 DYSPHAGIA, OROPHARYNGEAL: Status: RESOLVED | Noted: 2022-08-30 | Resolved: 2025-07-10

## 2025-07-10 PROBLEM — R53.83 OTHER FATIGUE: Status: RESOLVED | Noted: 2024-01-12 | Resolved: 2025-07-10

## 2025-07-10 PROBLEM — Z87.898 HISTORY OF DYSURIA: Status: RESOLVED | Noted: 2024-11-05 | Resolved: 2025-07-10

## 2025-07-10 PROBLEM — M76.821 POSTERIOR TIBIALIS TENDINITIS OF BOTH LOWER EXTREMITIES: Status: RESOLVED | Noted: 2022-05-10 | Resolved: 2025-07-10

## 2025-07-10 PROBLEM — Z87.898 HISTORY OF URINARY FREQUENCY: Status: RESOLVED | Noted: 2024-07-23 | Resolved: 2025-07-10

## 2025-07-10 PROBLEM — B37.9 YEAST INFECTION: Status: RESOLVED | Noted: 2024-07-29 | Resolved: 2025-07-10

## 2025-07-10 PROBLEM — R55 NEAR SYNCOPE: Status: RESOLVED | Noted: 2018-09-27 | Resolved: 2025-07-10

## 2025-07-10 PROBLEM — Z87.898 HISTORY OF URINARY FREQUENCY: Status: RESOLVED | Noted: 2025-03-24 | Resolved: 2025-07-10

## 2025-07-10 PROBLEM — Z87.898 HISTORY OF NAUSEA: Status: RESOLVED | Noted: 2020-07-07 | Resolved: 2025-07-10
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
## 2025-07-10 PROBLEM — Z71.85 VACCINE COUNSELING: Status: RESOLVED | Noted: 2024-11-05 | Resolved: 2025-07-10

## 2025-07-10 PROBLEM — R74.8 INCREASED LIVER ENZYMES: Status: RESOLVED | Noted: 2023-10-18 | Resolved: 2025-07-10

## 2025-07-10 PROBLEM — M54.50 ACUTE LOW BACK PAIN, UNSPECIFIED BACK PAIN LATERALITY, UNSPECIFIED WHETHER SCIATICA PRESENT: Status: RESOLVED | Noted: 2025-03-31 | Resolved: 2025-07-10

## 2025-07-10 PROCEDURE — 99215 OFFICE O/P EST HI 40 MIN: CPT | Mod: 95

## 2025-07-10 PROCEDURE — G2211 COMPLEX E/M VISIT ADD ON: CPT | Mod: 95

## 2025-07-10 RX ORDER — HYDROXYCHLOROQUINE SULFATE 300 MG/1
300 TABLET ORAL
Qty: 90 | Refills: 3 | Status: ACTIVE | COMMUNITY
Start: 2025-07-10 | End: 1900-01-01

## 2025-07-11 RX ORDER — AMOXICILLIN AND CLAVULANATE POTASSIUM 875; 125 MG/1; MG/1
875-125 TABLET, COATED ORAL
Qty: 20 | Refills: 1 | Status: ACTIVE | COMMUNITY
Start: 2025-07-11 | End: 1900-01-01

## 2025-07-15 ENCOUNTER — TRANSCRIPTION ENCOUNTER (OUTPATIENT)
Age: 60
End: 2025-07-15

## 2025-07-22 ENCOUNTER — RESULT REVIEW (OUTPATIENT)
Age: 60
End: 2025-07-22

## 2025-07-22 ENCOUNTER — APPOINTMENT (OUTPATIENT)
Dept: CT IMAGING | Facility: CLINIC | Age: 60
End: 2025-07-22
Payer: COMMERCIAL

## 2025-07-22 PROCEDURE — 74177 CT ABD & PELVIS W/CONTRAST: CPT

## 2025-07-25 ENCOUNTER — APPOINTMENT (OUTPATIENT)
Dept: COLORECTAL SURGERY | Facility: CLINIC | Age: 60
End: 2025-07-25
Payer: COMMERCIAL

## 2025-07-25 ENCOUNTER — NON-APPOINTMENT (OUTPATIENT)
Age: 60
End: 2025-07-25

## 2025-07-25 VITALS
OXYGEN SATURATION: 97 % | RESPIRATION RATE: 14 BRPM | DIASTOLIC BLOOD PRESSURE: 76 MMHG | BODY MASS INDEX: 26.5 KG/M2 | HEIGHT: 60 IN | SYSTOLIC BLOOD PRESSURE: 113 MMHG | HEART RATE: 75 BPM | WEIGHT: 135 LBS

## 2025-07-25 DIAGNOSIS — K57.32 DIVERTICULITIS OF LARGE INTESTINE W/OUT PERFORATION OR ABSCESS W/OUT BLEEDING: ICD-10-CM

## 2025-07-25 PROCEDURE — 99214 OFFICE O/P EST MOD 30 MIN: CPT

## 2025-08-14 ENCOUNTER — APPOINTMENT (OUTPATIENT)
Dept: RHEUMATOLOGY | Facility: CLINIC | Age: 60
End: 2025-08-14
Payer: COMMERCIAL

## 2025-08-14 DIAGNOSIS — M35.00 SICCA SYNDROME, UNSPECIFIED: ICD-10-CM

## 2025-08-14 DIAGNOSIS — M06.9 RHEUMATOID ARTHRITIS, UNSPECIFIED: ICD-10-CM

## 2025-08-14 DIAGNOSIS — Z51.81 ENCOUNTER FOR THERAPEUTIC DRUG LVL MONITORING: ICD-10-CM

## 2025-08-14 DIAGNOSIS — Z79.60 LONG TERM (CURRENT) USE OF UNSPECIFIED IMMUNOMODULATORS AND IMMUNOSUPPRESSANTS: ICD-10-CM

## 2025-08-14 DIAGNOSIS — Z79.899 OTHER LONG TERM (CURRENT) DRUG THERAPY: ICD-10-CM

## 2025-08-14 PROCEDURE — 99214 OFFICE O/P EST MOD 30 MIN: CPT | Mod: 95

## 2025-08-14 PROCEDURE — G2211 COMPLEX E/M VISIT ADD ON: CPT | Mod: 95

## 2025-08-26 ENCOUNTER — TRANSCRIPTION ENCOUNTER (OUTPATIENT)
Age: 60
End: 2025-08-26

## 2025-09-02 ENCOUNTER — TRANSCRIPTION ENCOUNTER (OUTPATIENT)
Age: 60
End: 2025-09-02

## 2025-09-03 ENCOUNTER — TRANSCRIPTION ENCOUNTER (OUTPATIENT)
Age: 60
End: 2025-09-03

## 2025-09-04 ENCOUNTER — TRANSCRIPTION ENCOUNTER (OUTPATIENT)
Age: 60
End: 2025-09-04

## 2025-09-05 ENCOUNTER — TRANSCRIPTION ENCOUNTER (OUTPATIENT)
Age: 60
End: 2025-09-05

## 2025-09-10 ENCOUNTER — APPOINTMENT (OUTPATIENT)
Dept: DERMATOLOGY | Facility: CLINIC | Age: 60
End: 2025-09-10
Payer: COMMERCIAL

## 2025-09-10 DIAGNOSIS — M53.9 DORSOPATHY, UNSPECIFIED: ICD-10-CM

## 2025-09-10 DIAGNOSIS — Z80.0 FAMILY HISTORY OF MALIGNANT NEOPLASM OF DIGESTIVE ORGANS: ICD-10-CM

## 2025-09-10 DIAGNOSIS — E78.00 PURE HYPERCHOLESTEROLEMIA, UNSPECIFIED: ICD-10-CM

## 2025-09-10 DIAGNOSIS — Z79.52 LONG TERM (CURRENT) USE OF SYSTEMIC STEROIDS: ICD-10-CM

## 2025-09-10 DIAGNOSIS — Z78.9 OTHER SPECIFIED HEALTH STATUS: ICD-10-CM

## 2025-09-10 DIAGNOSIS — R32 UNSPECIFIED URINARY INCONTINENCE: ICD-10-CM

## 2025-09-10 DIAGNOSIS — R35.1 NOCTURIA: ICD-10-CM

## 2025-09-10 DIAGNOSIS — R39.15 URGENCY OF URINATION: ICD-10-CM

## 2025-09-10 DIAGNOSIS — K58.9 IRRITABLE BOWEL SYNDROME, UNSPECIFIED: ICD-10-CM

## 2025-09-10 DIAGNOSIS — L73.8 OTHER SPECIFIED FOLLICULAR DISORDERS: ICD-10-CM

## 2025-09-10 PROCEDURE — 99214 OFFICE O/P EST MOD 30 MIN: CPT

## 2025-09-10 RX ORDER — FLUTICASONE PROPIONATE 0.5 MG/G
0.05 CREAM TOPICAL TWICE DAILY
Qty: 1 | Refills: 2 | Status: ACTIVE | COMMUNITY
Start: 2025-09-10 | End: 1900-01-01